# Patient Record
Sex: FEMALE | URBAN - METROPOLITAN AREA
[De-identification: names, ages, dates, MRNs, and addresses within clinical notes are randomized per-mention and may not be internally consistent; named-entity substitution may affect disease eponyms.]

---

## 2018-01-02 ENCOUNTER — APPOINTMENT (RX ONLY)
Dept: URBAN - METROPOLITAN AREA CLINIC 58 | Facility: CLINIC | Age: 28
Setting detail: DERMATOLOGY
End: 2018-01-02

## 2018-01-02 DIAGNOSIS — D485 NEOPLASM OF UNCERTAIN BEHAVIOR OF SKIN: ICD-10-CM

## 2018-01-02 DIAGNOSIS — L40.0 PSORIASIS VULGARIS: ICD-10-CM

## 2018-01-02 PROBLEM — F32.9 MAJOR DEPRESSIVE DISORDER, SINGLE EPISODE, UNSPECIFIED: Status: ACTIVE | Noted: 2018-01-02

## 2018-01-02 PROBLEM — D48.5 NEOPLASM OF UNCERTAIN BEHAVIOR OF SKIN: Status: ACTIVE | Noted: 2018-01-02

## 2018-01-02 PROCEDURE — ? COSMETIC SHAVE REMOVAL (NO PATHOLOGY)

## 2018-01-02 PROCEDURE — C9999: HCPCS

## 2018-01-02 PROCEDURE — ? PRESCRIPTION

## 2018-01-02 PROCEDURE — ? COUNSELING

## 2018-01-02 PROCEDURE — 99202 OFFICE O/P NEW SF 15 MIN: CPT

## 2018-01-02 RX ORDER — FLUOCINONIDE 0.5 MG/G
CREAM TOPICAL
Qty: 1 | Refills: 1 | COMMUNITY
Start: 2018-01-02

## 2018-01-02 RX ADMIN — FLUOCINONIDE: 0.5 CREAM TOPICAL at 22:27

## 2018-01-02 ASSESSMENT — LOCATION SIMPLE DESCRIPTION DERM
LOCATION SIMPLE: RIGHT PRETIBIAL REGION
LOCATION SIMPLE: SUBMENTAL CHIN
LOCATION SIMPLE: RIGHT ANKLE

## 2018-01-02 ASSESSMENT — LOCATION ZONE DERM
LOCATION ZONE: LEG
LOCATION ZONE: FACE

## 2018-01-02 ASSESSMENT — LOCATION DETAILED DESCRIPTION DERM
LOCATION DETAILED: SUBMENTAL CHIN
LOCATION DETAILED: RIGHT ANTERIOR MEDIAL MALLEOLUS
LOCATION DETAILED: RIGHT DISTAL PRETIBIAL REGION

## 2018-01-02 NOTE — PROCEDURE: COSMETIC SHAVE REMOVAL (NO PATHOLOGY)
Price (Use Numbers Only, No Special Characters Or $): 0.0
Hemostasis: Drysol
Post-Care Instructions: I reviewed with the patient in detail post-care instructions. Patient is to keep the biopsy site dry overnight, and then apply bacitracin twice daily until healed. Patient may apply hydrogen peroxide soaks to remove any crusting.
Consent was obtained from the patient. The risks and benefits to therapy were discussed in detail. Specifically, the risks of infection, scarring, bleeding, prolonged wound healing, incomplete removal, allergy to anesthesia, nerve injury and recurrence were addressed. Prior to the procedure, the treatment site was clearly identified and confirmed by the patient. All components of Universal Protocol/PAUSE Rule completed.
Anesthesia Volume In Cc: 0.4
Body Location Override (Optional - Billing Will Still Be Based On Selected Body Map Location If Applicable): Right Submental
Detail Level: Detailed
X Size Of Lesion In Cm (Optional): 0
Anesthesia Type: 1% lidocaine with epinephrine
Wound Care: Bacitracin
Render Post-Care Instructions In Note?: no